# Patient Record
Sex: MALE | HISPANIC OR LATINO | Employment: FULL TIME | URBAN - METROPOLITAN AREA
[De-identification: names, ages, dates, MRNs, and addresses within clinical notes are randomized per-mention and may not be internally consistent; named-entity substitution may affect disease eponyms.]

---

## 2018-08-06 ENCOUNTER — OFFICE VISIT (OUTPATIENT)
Dept: RETAIL CLINIC | Facility: CLINIC | Age: 33
End: 2018-08-06

## 2018-08-06 VITALS
RESPIRATION RATE: 18 BRPM | SYSTOLIC BLOOD PRESSURE: 120 MMHG | TEMPERATURE: 97.8 F | DIASTOLIC BLOOD PRESSURE: 68 MMHG | OXYGEN SATURATION: 98 % | HEART RATE: 80 BPM

## 2018-08-06 DIAGNOSIS — L08.9 BACTERIAL SKIN INFECTION: ICD-10-CM

## 2018-08-06 DIAGNOSIS — B96.89 BACTERIAL SKIN INFECTION: ICD-10-CM

## 2018-08-06 DIAGNOSIS — B86 SCABIES: Primary | ICD-10-CM

## 2018-08-06 PROCEDURE — 99203 OFFICE O/P NEW LOW 30 MIN: CPT | Performed by: NURSE PRACTITIONER

## 2018-08-06 RX ORDER — CEPHALEXIN 500 MG/1
500 CAPSULE ORAL 2 TIMES DAILY
Qty: 20 CAPSULE | Refills: 0 | Status: SHIPPED | OUTPATIENT
Start: 2018-08-06

## 2018-08-06 RX ORDER — PERMETHRIN 50 MG/G
CREAM TOPICAL
Qty: 60 G | Refills: 1 | Status: SHIPPED | OUTPATIENT
Start: 2018-08-06

## 2018-08-06 NOTE — PATIENT INSTRUCTIONS
Piojos en adultos  (Lice, Adult)  Los piojos son pequeños insectos con garras en los extremos de las patas. Son pequeños parásitos que viven en el cuerpo humano. A menudo, los piojos hacen seo hogar en el omar de liam persona, danilo el omar de la shante o en el pubis. Los piojos del pubis a veces se denominan ladillas. Los piojos nacen de pequeños huevos redondos, que se pegan a la base del omar. Los huevos de los piojos también se denominan liendres.  Los piojos causan irritación de la piel y picazón en la kev del omar infestado. Si keaton tener piojos puede ser molesto, no es peligroso y estos no propagan enfermedades. Con el tratamiento, en general, los síntomas desaparecen en pocos días.  CAUSAS  Los piojos se pueden contagiar de liam persona a otra. Los piojos trepan. No vuelan ni saltan. Para contraer piojos:  · Debe tener un contacto muy cercano con la persona infestada.  · Debe compartir objetos infestados que tocan la piel o el omar. Estos incluyen objetos personales, danilo gorros, peines, cepillos, toallas, ropa, almohadas o sábanas.  Los piojos del pubis se contagian por contacto sexual.  FACTORES DE RIESGO  Si keaton tener piojos es más común entre niños pequeños, cualquiera puede contraer piojos. Los piojos se desarrollan mejor en un clima cálido, de modo que manuel tipo de clima aumenta el riesgo.  SIGNOS Y SÍNTOMAS  · Picazón en la kev afectada.  · Irritación de la piel.  · Sensación de que algo se mueve en el omar.  · Erupción cutánea o llagas en la piel.  · Pequeñas escamas o sacos cerca del cuero cabelludo. Estos pueden ser de color alford, amarillo o prashant.  · Pequeños bichos que trepan por el omar o el cuero cabelludo.    DIAGNÓSTICO  El diagnóstico se basará en los síntomas y el examen físico. El médico examinará de cerca la kev afectada para determinar si hay piojos vivos, huevos pequeños (liendres) y cáscaras de huevos vacías.  Los huevos por lo general son de color amarillo o  prashant. Las cáscaras de huevo vacías son blancuzcas. Los piojos son grises o marrones.  TRATAMIENTO  El tratamiento contra los piojos incluye lo siguiente:  · Usar un enjuague para el omar que contenga un insecticida suave para matar piojos. El médico recomendará un enjuague recetado o de venta sloane.  · Eliminar los piojos, los huevos y las cáscaras de huevo vacías con un peine o con pincitas.  · Wicho y guardar en liam bolsa seo ropa y la ropa de cama.  Las mujeres embarazadas no deben usar champú o crema con medicamento sin consultar al médico.  INSTRUCCIONES PARA EL CUIDADO EN EL HOGAR  · Aplique el enjuague con medicamento danilo se lo haya indicado el médico. Siga cuidadosamente las instrucciones de la etiqueta. Las instrucciones generales para la aplicación de enjuagues pueden incluir estos pasos:  ? Colóquese ropa interior o liam camisa vieja, o utilice liam toalla en akiko de que el enjuague manche.  ? Lávese la shante o el pubis y seque con liam toalla antes de aplicar el enjuague si se le indicó hacerlo.  ? Cuando el omar esté seco, aplique el enjuague. Déjese el enjuague en el omar eric el tiempo especificado en las instrucciones.  ? Enjuague la kev con agua.  ? Peine el omar húmedo desde cerca de la piel hacia los extremos para eliminar piojos, huevos y cáscaras de huevo.  ? No lave el omar infestado por 2 días mientras el medicamento liao los piojos.  ? Si es necesario, repita el tratamiento en 7 a 10 días.  · Controle si quedan piojos, huevos o cáscaras de huevo cada 2 o 3 días, eric 2 semanas o según le hayan indicado. Después del tratamiento, los piojos restantes deberían moverse más lento.  · Elimine los piojos, los huevos o las cáscaras de huevo restantes con un peine de dientes finos.  · Lave todos los gorros, toallas, bufandas, chaquetas, ropa de cama y ropa en [place].  · Coloque en bolsas de plástico eric 2 semanas los objetos que no se puedan wicho, que hayan estado  expuestos.  · Ponga en [place] eric 10 minutos todos los peines y cepillos.  · Aspire los muebles para eliminar cualquier omar suelto. No es necesario usar sustancias químicas, que pueden ser tóxicas. Los piojos sobreviven solo 1 o 2 días fuera de la piel humana. Los huevos pueden sobrevivir solo 1 semana.  · Si tiene piojos en el pubis, informe a nick parejas sexuales que busquen tratamiento.  · Si tiene piojos en la shante, pregunte al médico si otros familiares o personas que tengan un contacto cercano también deben examinarse o tratarse.  · Concurra a todas las visitas de control danilo se lo haya indicado el médico. [place] es importante.    SOLICITE ATENCIÓN MÉDICA SI:  · Tiene llagas que parecen infectadas.  · La erupción cutánea o las llagas no desaparecen en 1 semana.  · Los piojos o los huevos regresan o no desaparecen a pesar del tratamiento.    ASEGÚRESE DE QUE:  · Comprende estas instrucciones.  · Controlará seo afección.  · Recibirá ayuda de inmediato si no mejora o si empeora.    Esta información no tiene danilo fin reemplazar el consejo del médico. Asegúrese de hacerle al médico cualquier pregunta que tenga.  Document Released: 09/27/2006 Document Revised: 01/08/2016 Document Reviewed: 06/16/2017  [employer] Interactive Patient Education © 2017 Else[employer]    Celulitis en los adultos  (Cellulitis, Adult)  La celulitis es liam infección de la piel. La kev infectada generalmente está de color liriano y causa dolor. Esta afección ocurre con más frecuencia en los brazos y en las piernas. Es importante realizar un tratamiento para esta afección.  CUIDADOS EN EL HOGAR  · [place] los medicamentos de venta sloane y los recetados solamente danilo se lo haya indicado el médico.  · Si le recetaron un antibiótico, tómelo danilo se lo haya indicado el médico. No deje de leno los antibióticos aunque comience a sentirse mejor.  · Asha suficiente líquido para mantener el pis (orina) seble o de color amarillo pálido.  · No  toque ni frote la kev infectada.  · Cuando esté sentado o acostado, levante (eleve) la kev infectada por encima del nivel del corazón.  · Colóquese paños húmedos tibios o fríos (compresas tibias o frías) sobre la kev infectada. Hágalo danilo se lo haya indicado el médico.  · Concurra a todas las visitas de control danilo se lo haya indicado el médico. [place] es importante. Estas visitas le permiten al médico asegurarse de que la infección no está empeorando.  SOLICITE AYUDA SI:  · Tiene fiebre.  · Los síntomas no mejoran después de 1 o 2 días de tratamiento.  · El hueso o la articulación que se encuentran debajo de la kev infectada empiezan a dolerle después de que la piel se jonathan.  · La infección regresa. [place] puede ocurrir en la misma kev o en otra.  · Tiene un bulto hinchado en la kev infectada.  · Aparecen nuevos síntomas.  · Se siente enfermo y también tiene obi musculares.  SOLICITE AYUDA DE INMEDIATO SI:  · Los síntomas empeoran.  · Se siente muy somnoliento.  · Devuelve vomita o tiene deposiciones acuosas (diarrea).  · Tiene líneas eduardo que salen desde la kev infectada.  · La kev de color liriano se agranda.  · La kev de color liriano se oscurece.  Esta información no tiene danilo fin reemplazar el consejo del médico. Asegúrese de hacerle al médico cualquier pregunta que tenga.  Document Released: 06/07/2011 Document Revised: 04/10/2017 Document Reviewed: 10/26/2016  Elsevier Interactive Patient Education © 2018 Elsevier Inc.

## 2018-08-06 NOTE — PROGRESS NOTES
iWn Dumont is a 33 y.o. male.     Used translation service for visit      Rash   This is a new problem. The current episode started in the past 7 days. The problem has been gradually worsening since onset. The affected locations include the left arm, right arm, left upper leg, left lower leg, right upper leg, right lower leg and right hand. The rash is characterized by itchiness and redness. He was exposed to nothing. Pertinent negatives include no facial edema, fever, shortness of breath or sore throat. Past treatments include nothing. There is no history of allergies, asthma or eczema.        The following portions of the patient's history were reviewed and updated as appropriate: allergies, current medications, past family history, past medical history, past social history, past surgical history and problem list.    Review of Systems   Constitutional: Positive for chills. Negative for fever.   HENT: Negative for sore throat.    Respiratory: Negative for shortness of breath.    Skin: Positive for rash.           Physical Exam   Constitutional: He appears well-developed and well-nourished. No distress.   HENT:   Head: Normocephalic and atraumatic.   Mouth/Throat: Oropharynx is clear and moist.   Eyes: Pupils are equal, round, and reactive to light. EOM are normal.   Cardiovascular: Normal rate, regular rhythm and normal heart sounds.    No murmur heard.  Pulmonary/Chest: Effort normal and breath sounds normal. No respiratory distress.   Skin: Rash (multiple scabbed papules and pustules on erythematous bases on bilateral hands, arms, feet and legs.  Patient reports extremem pruitis) noted.   Psychiatric: He has a normal mood and affect. His behavior is normal. Judgment and thought content normal.           Diagnoses and all orders for this visit:    Scabies    Bacterial skin infection    Other orders  -     Pediatric Multivitamins-Fl (MULTIVITAMINS/FL PO); Take  by mouth.  -     permethrin (ELIMITE) 5  % cream; Apply head to toe and leave for 8 hours then rinse.  Repeat in one week.  -     cephalexin (KEFLEX) 500 MG capsule; Take 1 capsule by mouth 2 (Two) Times a Day.      Piojos en adultos  (Lice, Adult)  Los piojos son pequeños insectos con garras en los extremos de las patas. Son pequeños parásitos que viven en el cuerpo humano. A menudo, los piojos hacen seo hogar en el omar de liam persona, danilo el omar de la shante o en el pubis. Los piojos del pubis a veces se denominan ladillas. Los piojos nacen de pequeños huevos redondos, que se pegan a la base del omar. Los huevos de los piojos también se denominan liendres.  Los piojos causan irritación de la piel y picazón en la kev del omar infestado. Si keaton tener piojos puede ser molesto, no es peligroso y estos no propagan enfermedades. Con el tratamiento, en general, los síntomas desaparecen en pocos días.  CAUSAS  Los piojos se pueden contagiar de liam persona a otra. Los piojos trepan. No vuelan ni saltan. Para contraer piojos:  · Debe tener un contacto muy cercano con la persona infestada.  · Debe compartir objetos infestados que tocan la piel o el omar. Estos incluyen objetos personales, danilo gorros, peines, cepillos, toallas, ropa, almohadas o sábanas.  Los piojos del pubis se contagian por contacto sexual.  FACTORES DE RIESGO  Si keaton tener piojos es más común entre niños pequeños, cualquiera puede contraer piojos. Los piojos se desarrollan mejor en un clima cálido, de modo que manuel tipo de clima aumenta el riesgo.  SIGNOS Y SÍNTOMAS  · Picazón en la kev afectada.  · Irritación de la piel.  · Sensación de que algo se mueve en el omar.  · Erupción cutánea o llagas en la piel.  · Pequeñas escamas o sacos cerca del cuero cabelludo. Estos pueden ser de color alford, amarillo o prashant.  · Pequeños bichos que trepan por el omar o el cuero cabelludo.    DIAGNÓSTICO  El diagnóstico se basará en los síntomas y el examen físico. El médico  examinará de cerca la kev afectada para determinar si hay piojos vivos, huevos pequeños (liendres) y cáscaras de huevos vacías.  Los huevos por lo general son de color amarillo o prashant. Las cáscaras de huevo vacías son blancuzcas. Los piojos son grises o marrones.  TRATAMIENTO  El tratamiento contra los piojos incluye lo siguiente:  · Usar un enjuague para el omar que contenga un insecticida suave para matar piojos. El médico recomendará un enjuague recetado o de venta sloane.  · Eliminar los piojos, los huevos y las cáscaras de huevo vacías con un peine o con pincitas.  · [place] y guardar en liam bolsa seo ropa y la ropa de cama.  Las mujeres embarazadas no deben usar champú o crema con medicamento sin consultar al médico.  INSTRUCCIONES PARA EL CUIDADO EN EL HOGAR  · Aplique el enjuague con medicamento danilo se lo haya indicado el médico. Siga cuidadosamente las instrucciones de la etiqueta. Las instrucciones generales para la aplicación de enjuagues pueden incluir estos pasos:  ? Colóquese ropa interior o liam camisa vieja, o utilice liam toalla en akiko de que el enjuague manche.  ? Lávese la shante o el pubis y seque con liam toalla antes de aplicar el enjuague si se le indicó hacerlo.  ? Cuando el omar esté seco, aplique el enjuague. Déjese el enjuague en el omar eric el tiempo especificado en las instrucciones.  ? Enjuague la kev con agua.  ? Peine el omar húmedo desde cerca de la piel hacia los extremos para eliminar piojos, huevos y cáscaras de huevo.  ? No lave el omar infestado por 2 días mientras el medicamento liao los piojos.  ? Si es necesario, repita el tratamiento en 7 a 10 días.  · Controle si quedan piojos, huevos o cáscaras de huevo cada 2 o 3 días, eric 2 semanas o según le hayan indicado. Después del tratamiento, los piojos restantes deberían moverse más lento.  · Elimine los piojos, los huevos o las cáscaras de huevo restantes con un peine de dientes finos.  · Lave todos los  gorros, toallas, bufandas, chaquetas, ropa de cama y ropa en [place].  · Coloque en bolsas de plástico eric 2 semanas los objetos que no se puedan wicho, que hayan estado expuestos.  · Ponga en [place] eric 10 minutos todos los peines y cepillos.  · Aspire los muebles para eliminar cualquier omar suelto. No es necesario usar sustancias químicas, que pueden ser tóxicas. Los piojos sobreviven solo 1 o 2 días fuera de la piel humana. Los huevos pueden sobrevivir solo 1 semana.  · Si tiene piojos en el pubis, informe a nick parejas sexuales que busquen tratamiento.  · Si tiene piojos en la shante, pregunte al médico si otros familiares o personas que tengan un contacto cercano también deben examinarse o tratarse.  · Concurra a todas las visitas de control danilo se lo haya indicado el médico. [place] es importante.    SOLICITE ATENCIÓN MÉDICA SI:  · Tiene llagas que parecen infectadas.  · La erupción cutánea o las llagas no desaparecen en 1 semana.  · Los piojos o los huevos regresan o no desaparecen a pesar del tratamiento.    ASEGÚRESE DE QUE:  · Comprende estas instrucciones.  · Controlará seo afección.  · Recibirá ayuda de inmediato si no mejora o si empeora.    Esta información no tiene danilo fin reemplazar el consejo del médico. Asegúrese de hacerle al médico cualquier pregunta que tenga.  Document Released: 09/27/2006 Document Revised: 01/08/2016 Document Reviewed: 06/16/2017  [employer] Interactive Patient Education © 2017 Elsevier Inc.    Celulitis en los adultos  (Cellulitis, Adult)  La celulitis es liam infección de la piel. La kev infectada generalmente está de color liriano y causa dolor. Esta afección ocurre con más frecuencia en los brazos y en las piernas. Es importante realizar un tratamiento para esta afección.  CUIDADOS EN EL HOGAR  · [place] los medicamentos de venta sloane y los recetados solamente danlio se lo haya indicado el médico.  · Si le recetaron un antibiótico, tómelo danilo se lo haya  indicado el médico. No deje de leno los antibióticos aunque comience a sentirse mejor.  · Asha suficiente líquido para mantener el pis (orina) seble o de color amarillo pálido.  · No toque ni frote la kev infectada.  · Cuando esté sentado o acostado, levante (eleve) la kev infectada por encima del nivel del corazón.  · Colóquese paños húmedos tibios o fríos (compresas tibias o frías) sobre la kev infectada. Hágalo danilo se lo haya indicado el médico.  · Concurra a todas las visitas de control danilo se lo haya indicado el médico. [place] es importante. Estas visitas le permiten al médico asegurarse de que la infección no está empeorando.  SOLICITE AYUDA SI:  · Tiene fiebre.  · Los síntomas no mejoran después de 1 o 2 días de tratamiento.  · El hueso o la articulación que se encuentran debajo de la kev infectada empiezan a dolerle después de que la piel se jonathan.  · La infección regresa. [place] puede ocurrir en la misma kev o en otra.  · Tiene un bulto hinchado en la kev infectada.  · Aparecen nuevos síntomas.  · Se siente enfermo y también tiene obi musculares.  SOLICITE AYUDA DE INMEDIATO SI:  · Los síntomas empeoran.  · Se siente muy somnoliento.  · Devuelve vomita o tiene deposiciones acuosas (diarrea).  · Tiene líneas eduardo que salen desde la kev infectada.  · La kev de color liriano se agranda.  · La kev de color liriano se oscurece.  Esta información no tiene danilo fin reemplazar el consejo del médico. Asegúrese de hacerle al médico cualquier pregunta que tenga.  Document Released: 06/07/2011 Document Revised: 04/10/2017 Document Reviewed: 10/26/2016  Elsevier Interactive Patient Education © 2018 Elsevier Inc